# Patient Record
Sex: MALE | Employment: FULL TIME | URBAN - METROPOLITAN AREA
[De-identification: names, ages, dates, MRNs, and addresses within clinical notes are randomized per-mention and may not be internally consistent; named-entity substitution may affect disease eponyms.]

---

## 2021-07-01 ENCOUNTER — APPOINTMENT (OUTPATIENT)
Dept: PHYSICAL THERAPY | Age: 41
End: 2021-07-01
Payer: COMMERCIAL

## 2021-07-07 ENCOUNTER — HOSPITAL ENCOUNTER (OUTPATIENT)
Dept: PHYSICAL THERAPY | Age: 41
Discharge: HOME | End: 2021-07-07
Payer: COMMERCIAL

## 2021-07-07 PROCEDURE — 97110 THERAPEUTIC EXERCISES: CPT

## 2021-07-07 PROCEDURE — 97161 PT EVAL LOW COMPLEX 20 MIN: CPT

## 2021-07-07 PROCEDURE — 97140 MANUAL THERAPY 1/> REGIONS: CPT

## 2021-07-07 NOTE — THERAPY EVALUATION
Trey Hoffmann  : 1980  Primary: Raffaele Nathrisann marie  Secondary:  [address] at [facility], [address] [facility]  [phone]   [phone]        OUTPATIENT PHYSICAL THERAPY:Initial Assessment 2021   ICD-10: Treatment Diagnosis: Low Back Pain [M54.5]  Precautions/Allergies:   Patient has no known allergies. TREATMENT PLAN:  Effective Dates: 2021 TO 2021. Frequency/Duration: 2 times a week for 30 Day(s) MEDICAL/REFERRING DIAGNOSIS:  Lumbar back pain [M54.5]  Strain of muscle, fascia and tendon of lower back, subsequent encounter [S39.012D]  Overexertion from strenuous movement or load, subsequent encounter [X50. 0XXD]   DATE OF ONSET: May 2021  REFERRING PHYSICIAN: Swathi Gonsalez MD MD Orders: Evaluate and Treat  Return MD Appointment: TBD     INITIAL ASSESSMENT:  Mr. 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 Upon examination, pt. Demonstrates pain with lumbar extension movements and pain with return from lumbar flexion ROM. Central posterior/anterior pressures to the lumbar spine also reproduce pain. Moderate flexibility limitations are present in the quadriceps, hip flexors, hip external rotators, and gluteal musculature bilaterally. Pt. Will benefit from flexibility exercises and strengthening in the core, lumbar, and gluteal musculature to address impairments identified through evaluation. PROBLEM LIST (Impacting functional limitations):  1. Decreased Strength  2. Decreased ADL/Functional Activities  3. Increased Pain  4. Decreased Activity Tolerance  5. Decreased Flexibility/Joint Mobility  6. Decreased [place] with Home Exercise Program INTERVENTIONS PLANNED: (Treatment may consist of any combination of the following)  1. Home Exercise Program (HEP)  2. Manual Therapy  3. Neuromuscular Re-education/Strengthening  4. Range of Motion (ROM)  5.  Therapeutic Activites  6. Therapeutic Exercise/Strengthening     GOALS: (Goals have been discussed and agreed upon with patient.)  Discharge Goals: Time Frame: 6 weeks  1. Pt. Will report < 2/10 LBP with lifting 25# to improve work performance. 2. Pt. Will demonstrate > 20 degrees of lumbar extension to improve lumbar mobility. 3. Pt. Will be independent with HEP to prevent return of symptoms. 4. Pt. Will score < 10% disability on the ROJELIO to demonstrate improved pain and function    OUTCOME MEASURE:   Tool Used: Modified Oswestry Low Back Pain Questionnaire  Score:  Initial: 10/50  Most Recent: X/50 (Date: -- )   Interpretation of Score: Each section is scored on a 0-5 scale, 5 representing the greatest disability. The scores of each section are added together for a total score of 50. MEDICAL NECESSITY:   · Patient is expected to demonstrate progress in strength and range of motion to increase independence with ADL's and work capacity. .  REASON FOR SERVICES/OTHER COMMENTS:  · Patient will benefit from skilled PT to address impairments identified through evaluation and return to previous ADL and recreational capacity. Total Duration:  PT Patient Time In/Time Out  Time In: 1530  Time Out: 1630    Rehabilitation Potential For Stated Goals: Good  Regarding Trey Charlespkins's therapy, I certify that the treatment plan above will be carried out by a therapist or under their direction. Thank you for this referral,  Laura Velarde, PT     Referring Physician Signature: Cesia White MD _______________________________ Date _____________      PAIN/SUBJECTIVE:   Initial: Pain Intensity 1: 4 /10 Post Session:  4/10   HISTORY:   History of Injury/Illness (Reason for Referral):  Pt. Attends PT c/o low back pain with onset in May after lifting a case of [employer] at work. Pt. Notes he experienced a sharp pain in the back and he had to modify his work activity to complete the shift secondary to LBP.   Pt. Reports he was changed to a lighter lifting duty which has helped. Pain is located in the central and R side of the low back. Pain is aggravated with bending and lifting objects including cases at work and his child at home. Pt. 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 Pt. Would like to address LBP and return to previous work activities. Pt. Does also have a R knee injury that is pending workers compensation intervention additionally. Past Medical History/Comorbidities:   Mr. Brian Tyson  has a past medical history of Allergic rhinitis and Hypertension. Mr. Brian Tyson  has a past surgical history that includes hx carpal tunnel release; hx other surgical; and hx cystectomy. Social History/Living Environment:     lives with family in two story home  Prior Level of Function/Work/Activity:  Functioned independently without limitations. Ambulatory/Rehab Services H2 Model Falls Risk Assessment   Risk Factors:       (1)  Gender [Male] Ability to Rise from Chair:       (0)  Ability to rise in a single movement   Falls Prevention Plan:       No modifications necessary   Total: (5 or greater = High Risk): 1   ©2010 [facility]. All Rights Reserved. [facility] Patent #[id number]. Federal Law prohibits the replication, distribution or use without written permission from [facility]   Current Medications:       Current Outpatient Medications:     naproxen (NAPROSYN) 500 mg tablet, Take 1 Tablet by mouth two (2) times daily (with meals). , Disp: 60 Tablet, Rfl: 1    cyclobenzaprine (FLEXERIL) 10 mg tablet, Take 1 Tablet by mouth three (3) times daily as needed for Muscle Spasm(s). , Disp: 45 Tablet, Rfl: 1    lisinopriL (PRINIVIL, ZESTRIL) 5 mg tablet, Take 1 Tab by mouth daily. , Disp: 90 Tab, Rfl: 1    fluticasone propionate (Flonase Allergy Relief) 50 mcg/actuation nasal spray, 2 Sprays by Both Nostrils route daily. , Disp: 1 Bottle, Rfl: 5    cetirizine (ZyrTEC) 10 mg tablet, Take 1 Tab by mouth daily. , Disp: 90 Tab, Rfl: 1   Date Last Reviewed:  7/7/2021   Number of Personal Factors/Comorbidities that affect the Plan of Care: 1-2: MODERATE COMPLEXITY   EXAMINATION:   OBSERVATION:    Palpation/Joint Mobility:   Left Right   Lumbar Paraspinals tender tender   Quadratus Lumborum -- --   Gluteals -- --   Thoracic Spine normal normal   Lumbar Spine Normal/painful Normal/painful     Range of Motion: Lumbar degrees  Flexion 110 degrees ([pain upon return)  Repeated Movement   Extension 20 pain  Flex: --   Side Bend L:15 R:15 Ext: --     Range of Motion: Hip  Extension limited limited   Flexion WNL WNL   Internal Rotation limited limited   External Rotation limited limited     Strength:   Left Right   Hip Extensors 4/5 4/5   Hip Abductors 4/5 4/5   Functional           Other:       Flexibility:                                                                             Neuroscreen:  Quadriceps  limited Reflexes Sensation   Iliopsoas limited L2-4: 2+ WNL   Hamstrings limited S1-2: 2+    Gluteals limited Babinski: --    Triceps Surae limited       Special Testing:   Left Right   Straight Leg Raise (-) (-)   Slump     Femoral Nerve  (-) (-)   Aberrant Movements (+) (+)   Prone Instability Test -- --   FADIR (-) (-)   MAREK (+) (+)   Elan (+) (+)   ; Body Structures Involved:  1. Joints  2. Muscles Body Functions Affected:  1. Neuromusculoskeletal  2. Movement Related Activities and Participation Affected:  1. Mobility  2.  Self Care   Number of elements (examined above) that affect the Plan of Care: 1-2: LOW COMPLEXITY   CLINICAL PRESENTATION:   Presentation: Stable and uncomplicated: LOW COMPLEXITY   CLINICAL DECISION MAKING:   Use of outcome tool(s) and clinical judgement create a POC that gives a: Clear prediction of patient's progress: LOW COMPLEXITY

## 2021-07-07 NOTE — PROGRESS NOTES
Kaleb Hoffmann  : 1980  Primary: Jazmin Wright  Secondary:  [address]  at [facility], [address] [facility]  [phone]   [phone]      OUTPATIENT PHYSICAL THERAPY: Daily Treatment Note 2021  Visit Count:  1    ICD-10: Treatment Diagnosis: Low Back Pain [M54.5]  Precautions/Allergies:   Patient has no known allergies. TREATMENT PLAN:  Effective Dates: 2021 TO 2021. Frequency/Duration: 2 times a week for 30 Day(s) MEDICAL/REFERRING DIAGNOSIS:  Lumbar back pain [M54.5]  Strain of muscle, fascia and tendon of lower back, subsequent encounter [S39.012D]  Overexertion from strenuous movement or load, subsequent encounter [X50. 0XXD]   DATE OF ONSET: May 2021  REFERRING PHYSICIAN: Janeth Mccarthy MD MD Orders: Evaluate and Treat  Return MD Appointment: TBD     Pre-treatment Symptoms/Complaints:  LBP  Pain: Initial: Pain Intensity 1: 4 /10 Post Session:  4/10   Medications Last Reviewed:  2021  Updated Objective Findings:  See evaluation note from today  TREATMENT:     Therapeutic Exercise: (15 Minutes):  Exercises per grid below to improve mobility and strength. Required minimal visual, verbal and manual cues to promote proper body alignment, promote proper body posture and promote proper body mechanics. Progressed resistance, range and repetitions as indicated. Date:  2021   Activity/Exercise Parameters   Lumbar rotation 3 x 5   Elan stretch 3 minutes   Half kneeling stretch 3 minutes   Piriformis stretch 4 minutes   Bird dog 3 x10             Manual Therapy (    Soft Tissue Mobilization Duration  Duration: 10 Minutes): Manual techniques to facilitate improved motion and decreased pain.  (Used abbreviations: MET - muscle energy technique; PNF - proprioceptive neuromuscular facilitation; NMR - neuromuscular re-education; a/p - anterior to posterior; p/a - posterior to anterior)   · Joint mobilization: lumbar manipulation: Grade V in sidelying      [employer] Portal  Treatment/Session Summary:    · Response to Treatment:  Pt. tolerates initial exercises with mild R sided LBP. Pt. will benefit from specific flexibility and strengthening exercises to address LBP. .  · Communication/Consultation:  None today  · Equipment provided today:  None today  · Recommendations/Intent for next treatment session: Next visit will focus on flexibility and core strengthening. Total Treatment Billable Duration:  35 minutes evaluation, 15 minutes therapeutic exercise, 10 minutes manual therapy.    PT Patient Time In/Time Out  Time In: 1530  Time Out: Eugene Loja, PT    Future Appointments   Date Time Provider Kamilla Bryant   7/13/2021  3:30 PM Laura Mcgowan PT [facility]   7/15/2021  3:30 PM Laura Mcgowan PT [facility]   7/19/2021 10:15 AM Sherri Lynn DPT [facility]   7/22/2021  1:30 PM Tika MARIO PT [facility]   7/26/2021  4:30 PM Laura Mcgowan PT [facility]

## 2021-07-13 ENCOUNTER — HOSPITAL ENCOUNTER (OUTPATIENT)
Dept: PHYSICAL THERAPY | Age: 41
End: 2021-07-13
Payer: COMMERCIAL

## 2021-07-15 ENCOUNTER — HOSPITAL ENCOUNTER (OUTPATIENT)
Dept: PHYSICAL THERAPY | Age: 41
Discharge: HOME | End: 2021-07-15
Payer: COMMERCIAL

## 2021-07-15 NOTE — PROGRESS NOTES
Kodak Maher Tahira  : 1980  Payor: [facility] / Plan: Augustina Izaguirre / Product Type: Workers Comp /  [address]  at [facility] [address]/ Dariusz Silvarosio [address] [facility], [address] [place] [address]  [phone]   [phone]          DATE: 7/15/2021    Patient cancelled appointment today due to being stuck at work. Will plan to follow up on next scheduled visit.     Jen Garzon, PT, DPT, OCS

## 2021-07-19 ENCOUNTER — HOSPITAL ENCOUNTER (OUTPATIENT)
Dept: PHYSICAL THERAPY | Age: 41
Discharge: HOME | End: 2021-07-19
Payer: COMMERCIAL

## 2021-07-19 PROCEDURE — 97110 THERAPEUTIC EXERCISES: CPT

## 2021-07-19 PROCEDURE — 97140 MANUAL THERAPY 1/> REGIONS: CPT

## 2021-07-19 NOTE — PROGRESS NOTES
Ector Hoffmann  : 1980  Primary: Raffaele Wright  Secondary:  [address] at [facility] 37, [address]  [phone]   [phone]      OUTPATIENT PHYSICAL THERAPY: Daily Treatment Note 2021  Visit Count:  2    ICD-10: Treatment Diagnosis: Low Back Pain [M54.5]  Precautions/Allergies:   Patient has no known allergies. TREATMENT PLAN:  Effective Dates: 2021 TO 2021. Frequency/Duration: 2 times a week for 30 Day(s) MEDICAL/REFERRING DIAGNOSIS:  Lumbar back pain [M54.5]  Strain of muscle, fascia and tendon of lower back, subsequent encounter [S39.012D]  Overexertion from strenuous movement or load, subsequent encounter [X50. 0XXD]   DATE OF ONSET: May 2021  REFERRING PHYSICIAN: Steve Dc MD MD Orders: Evaluate and Treat  Return MD Appointment: TBD     Pre-treatment Symptoms/Complaints:  Patient reports no current back pain and no pain at work but still has a lot of pain with sleeping. Pain: Initial:   0/10 Post Session:  0/10   Medications Last Reviewed:  2021  Updated Objective Findings:  None Today  TREATMENT:     Therapeutic Exercise: ( 30 minutes):  Exercises per grid below to improve mobility and strength. Required minimal visual, verbal and manual cues to promote proper body alignment, promote proper body posture and promote proper body mechanics. Progressed resistance, range and repetitions as indicated. Date:  2021   Activity/Exercise Parameters   Lumbar rotation 2 x 10 reps   Elan stretch 3 minutes   Half kneeling stretch    Piriformis stretch 4 minutes   Bird dog 3 x10   HS stretch 3 x 30 sec B   bridges 2 x 15 reps   Dead bug 2 x 10 reps    DKTC 2 x 30 sec          Manual Therapy (     10 minutes): Manual techniques to facilitate improved motion and decreased pain.  (Used abbreviations: MET - muscle energy technique; PNF - proprioceptive neuromuscular facilitation; NMR - neuromuscular re-education; a/p - anterior to posterior; p/a - posterior to anterior)   · In prone: STM to lumbar paraspinals with focus on the L side to decrease tissue tension and improve soft tissue mobility. [employer] [facility]  Treatment/Session Summary:    · Response to Treatment:  patient reported muscle fatigue post session from core exercises but no increase pain. he was educated on the importance of his HEP as he is only to attend therapy 1 time per week. .  · Communication/Consultation:  None today  · Equipment provided today:  None today  · Recommendations/Intent for next treatment session: Next visit will focus on flexibility and core strengthening.     Total Treatment Billable Duration:  40 minutes    PT Patient Time In/Time Out  Time In: 1015  Time Out: [address], DPT    Future Appointments   Date Time Provider Kamilla Bryant   7/22/2021  7:00 PM Gisela Us   7/26/2021  4:30 PM Jarad Tim, BRODIE MICHA [facility]

## 2021-07-22 ENCOUNTER — APPOINTMENT (OUTPATIENT)
Dept: PHYSICAL THERAPY | Age: 41
End: 2021-07-22
Payer: COMMERCIAL

## 2021-08-02 ENCOUNTER — APPOINTMENT (OUTPATIENT)
Dept: PHYSICAL THERAPY | Age: 41
End: 2021-08-02

## 2021-08-05 NOTE — THERAPY DISCHARGE
Trey Hoffmann  : 1980  Primary: [facility]  Secondary:  [address] at [address]  [phone]   [phone]        OUTPATIENT PHYSICAL THERAPY:Initial Assessment 21   ICD-10: Treatment Diagnosis: Low Back Pain [M54.5]  Precautions/Allergies:   Patient has no known allergies. TREATMENT PLAN:  Effective Dates: 2021 TO 2021. Frequency/Duration: 2 times a week for 30 Day(s) MEDICAL/REFERRING DIAGNOSIS:  Lumbar back pain [M54.5]  Strain of muscle, fascia and tendon of lower back, subsequent encounter [S39.012D]  Overexertion from strenuous movement or load, subsequent encounter [X50. 0XXD]   DATE OF ONSET: May 2021  REFERRING PHYSICIAN: Tammy Carpio MD MD Orders: Evaluate and Treat  Return MD Appointment: TBD     INITIAL ASSESSMENT:  Mr. 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 Upon examination, pt. Demonstrates pain with lumbar extension movements and pain with return from lumbar flexion ROM. Central posterior/anterior pressures to the lumbar spine also reproduce pain. Moderate flexibility limitations are present in the quadriceps, hip flexors, hip external rotators, and gluteal musculature bilaterally. Pt. Will benefit from flexibility exercises and strengthening in the core, lumbar, and gluteal musculature to address impairments identified through evaluation. 21 Discontinuation Summary: Pt. Attends 2 physical therapy sessions. Pt. Is unable to attend several scheduled sessions secondary to work schedule and was moving from the [facility] at the end of July. Pt. Gerard Encinass to meet set goals from initial evaluation secondary to limited attendance. Pt. Will be discharged from PT at this time. PROBLEM LIST (Impacting functional limitations):  1. Decreased Strength  2. Decreased ADL/Functional Activities  3.  Increased Pain  4. Decreased Activity Tolerance  5. Decreased Flexibility/Joint Mobility  6. Decreased [place] with Home Exercise Program INTERVENTIONS PLANNED: (Treatment may consist of any combination of the following)  1. Home Exercise Program (HEP)  2. Manual Therapy  3. Neuromuscular Re-education/Strengthening  4. Range of Motion (ROM)  5. Therapeutic Activites  6. Therapeutic Exercise/Strengthening     GOALS: (Goals have been discussed and agreed upon with patient.)  Discharge Goals: Time Frame: 6 weeks  1. Pt. Will report < 2/10 LBP with lifting 25# to improve work performance. NOT MET  2. Pt. Will demonstrate > 20 degrees of lumbar extension to improve lumbar mobility. NOT MET  3. Pt. Will be independent with HEP to prevent return of symptoms. NOT MET  4. Pt. Will score < 10% disability on the ROJELIO to demonstrate improved pain and function. NOT MET    OUTCOME MEASURE:   Tool Used: Modified Oswestry Low Back Pain Questionnaire  Score:  Initial: 10/50  Most Recent: X/50 (Date: -- )   Interpretation of Score: Each section is scored on a 0-5 scale, 5 representing the greatest disability. The scores of each section are added together for a total score of 50.           Yoanna Monroe, PT